# Patient Record
Sex: FEMALE | Race: WHITE | URBAN - NONMETROPOLITAN AREA
[De-identification: names, ages, dates, MRNs, and addresses within clinical notes are randomized per-mention and may not be internally consistent; named-entity substitution may affect disease eponyms.]

---

## 2017-08-24 ENCOUNTER — HOSPITAL ENCOUNTER (OUTPATIENT)
Dept: GENERAL RADIOLOGY | Age: 72
Discharge: HOME | End: 2017-08-24
Attending: EMERGENCY MEDICINE | Admitting: EMERGENCY MEDICINE

## 2017-08-24 DIAGNOSIS — M25.551 RIGHT HIP PAIN: ICD-10-CM

## 2018-05-02 ENCOUNTER — HOSPITAL ENCOUNTER (OUTPATIENT)
Dept: PHYSICAL THERAPY | Age: 73
Discharge: HOME | End: 2018-05-31

## 2018-05-02 ASSESSMENT — PAIN DESCRIPTION - PROGRESSION: CLINICAL_PROGRESSION: GRADUALLY IMPROVING

## 2018-05-02 ASSESSMENT — PAIN DESCRIPTION - ORIENTATION: ORIENTATION: RIGHT

## 2018-05-02 ASSESSMENT — PAIN SCALES - GENERAL: PAINLEVEL_OUTOF10: 0

## 2018-05-02 ASSESSMENT — PAIN DESCRIPTION - FREQUENCY: FREQUENCY: INTERMITTENT

## 2018-05-02 ASSESSMENT — PAIN DESCRIPTION - DESCRIPTORS: DESCRIPTORS: ACHING

## 2018-05-02 ASSESSMENT — PAIN DESCRIPTION - ONSET: ONSET: ON-GOING

## 2018-05-02 ASSESSMENT — PAIN DESCRIPTION - PAIN TYPE: TYPE: SURGICAL PAIN

## 2018-05-04 ENCOUNTER — HOSPITAL ENCOUNTER (OUTPATIENT)
Dept: PHYSICAL THERAPY | Age: 73
Discharge: HOME | End: 2018-05-04

## 2018-05-07 ENCOUNTER — HOSPITAL ENCOUNTER (OUTPATIENT)
Dept: PHYSICAL THERAPY | Age: 73
Discharge: HOME | End: 2018-05-07

## 2018-05-11 ENCOUNTER — HOSPITAL ENCOUNTER (OUTPATIENT)
Dept: PHYSICAL THERAPY | Age: 73
Discharge: HOME | End: 2018-05-11

## 2018-05-18 ENCOUNTER — HOSPITAL ENCOUNTER (OUTPATIENT)
Dept: PHYSICAL THERAPY | Age: 73
Discharge: HOME | End: 2018-05-18

## 2018-05-25 ENCOUNTER — HOSPITAL ENCOUNTER (OUTPATIENT)
Dept: PHYSICAL THERAPY | Age: 73
Discharge: HOME | End: 2018-05-25

## 2018-06-01 ENCOUNTER — HOSPITAL ENCOUNTER (OUTPATIENT)
Dept: OTHER | Age: 73
Discharge: HOME | End: 2018-06-30

## 2025-07-10 ENCOUNTER — HOSPITAL ENCOUNTER (OUTPATIENT)
Dept: PHYSICAL THERAPY | Age: 80
Setting detail: THERAPIES SERIES
Discharge: HOME | End: 2025-07-10
Payer: COMMERCIAL

## 2025-07-10 PROCEDURE — 97016 VASOPNEUMATIC DEVICE THERAPY: CPT

## 2025-07-10 PROCEDURE — 97110 THERAPEUTIC EXERCISES: CPT

## 2025-07-10 PROCEDURE — 97161 PT EVAL LOW COMPLEX 20 MIN: CPT

## 2025-07-10 NOTE — PROGRESS NOTES
Functional Mobility Training, Home Exercise Program, Anatomy of condition, Disease Specific Education             Treatment may include any combination of the following: Current Treatment Recommendations: Strengthening, ROM, Balance training, Gait training, Home exercise program, Stair training, Transfer training, Safety education & training, Neuromuscular re-education, Functional mobility training, Therapeutic activities, ADL/Self-care training, Manual, Patient/Caregiver education & training, IADL training, Pain management, Modalities     Frequency / Duration:  Patient to be seen 2 for 5 weeks      Eval Complexity:    Decision Making: Low Complexity         Therapist Signature: Suzy Beltrán PT, DPT    Date: 7/10/2025     I certify that the above Therapy Services are being furnished while the patient is under my care. I agree with the treatment plan and certify that this therapy is necessary.      Physician's Signature:  ___________________________   Date:_______                                                                   Tom Valdivia DO        Physician Comments: _______________________________________________    Please sign and return to [facility]YS[facility].  Please fax to the location listed below. THANK YOU for this referral!    [facility] PHYSICAL THERAP[facility]  [address]  [place] [facility] [address]  Dept: [phone]  Dept Fax: [phone]  Loc: [phone]       POC NOTE

## 2025-07-10 NOTE — FLOWSHEET NOTE
Therapeutic Activity     [] Ultrasound  [] Estim  [x] Gait Training      [] Cervical Traction [] Lumbar Traction  [x] Neuromuscular Re-education    [x] Cold/hotpack [] Iontophoresis   [x] Instruction in HEP      [x] Vasopneumatic   [] Dry Needling    [x] Manual Therapy               [] Aquatic Therapy              Electronically signed by:  Suzy Beltrán PT, DPT 7/10/2025, 5:02 PM

## 2025-07-10 NOTE — PLAN OF CARE
Outpatient Physical Therapy           [place]           [x] Phone: [phone]   Fax: [phone]  [place]           [] Phone: [phone]   Fax: [phone]     To:  Tom Valdivia, DO   From: Suzy Beltrán, PT, DPT     Patient: Filomena Hartman       : 1945  Diagnosis:  Diagnosis: Unilateral primary OA, right knee  Treatment Diagnosis: RLE weakness, impaired gait, impaired R knee ROM  Date: 7/10/2025    Physical Therapy Certification/Re-Certification Form  Dear Dr. Valdivia,  The following patient has been evaluated for physical therapy services and for therapy to continue, insurance requires physician review of the treatment plan initially and every 90 days. Please review the attached evaluation and/or summary of the patient's plan of care, and verify that you agree therapy should continue by signing the attached document and sending it back to our office.    Assessment:    Assessment: Pt is a 79-year-old female who presents to therapy s/p R TKA performed on 25. Upon assessment, pt does present with impaired R knee ROM, RLE weakness, impaired gait, impaired balance and reduced independence with ADLs and IADLs post-surgery. Pt would benefit from skilled therapy interventions to address listed impairments, progress toward goal completion, and improve ADL/IADL status. PT also warranted to reduce risk for further injury or decline.  Patient agrees with established plan of care and assisted in the development of their short term and long term goals. Patient had no adverse reaction with initial treatment and there are no barriers to learning.  Learning preferences include demonstration, practice, and handouts.  Patient expressed understanding of HEP and appears to be motivated to participate in an active PT program including compliance with HEP expectations.      Plan of Care/Treatment to date:  [x] Therapeutic Exercise  [x] Modalities:  [x] Therapeutic Activity     [] Ultrasound  [] Electrical

## 2025-07-15 ENCOUNTER — HOSPITAL ENCOUNTER (OUTPATIENT)
Dept: PHYSICAL THERAPY | Age: 80
Setting detail: THERAPIES SERIES
Discharge: HOME | End: 2025-07-15
Payer: COMMERCIAL

## 2025-07-15 PROCEDURE — 97016 VASOPNEUMATIC DEVICE THERAPY: CPT

## 2025-07-15 PROCEDURE — 97110 THERAPEUTIC EXERCISES: CPT

## 2025-07-15 NOTE — FLOWSHEET NOTE
wound.   She reports pain of 2-3/10 pain upon arrival.       Any changes in Ambulatory Summary Sheet?  None        Objective:    Knee Flexion AROM: 108 degrees, 109 degrees following heel slides   Knee Ext AROM: 0 degrees   Superior Glide of R patella and good VMO activation with quad sets    Exercises: (No more than 4 columns) \"Davie-Tarah\"  Exercise/Equipment 7/10/25 #1 7/15/25 #2 Date           WARM UP      nustep  L3 5 mins          TABLE      Heel slide X10, 5\" 2x10    Heel prop x1' 2 min    SAQ X10, 3\" 2x10    SLR x10 2x10             STANDING      Heel Raises  x10    Marches   X10 ea leg                                        PROPRIOCEPTION                                    MODALITIES                      Other Therapeutic Activities/Education:  HEP and importance of completion, POC and goals, anatomy and physiology related to condition    Home Exercise Program: Issued, practiced and pt demo ability to perform 7/10/2025  [employer]: [email or web address]      Manual Treatments:  none    Modalities:  Patient received vasocompression on their R knee for pain and inflammation for 10 min on low pressure. Patient had negative skin reaction afterwards.     Turned temp up to 40°. NEXT - put pillow lengthwise under knee.     Communication with other providers:  POC sent    Assessment:     Pt demonstrated good understanding of exercises and needed minimal cueing. AROM has improved. Quad strength and activation is improving nicely.  Pt reported 7/10 following game ready treatment due to leg in extension, 3/10 before leaving clinic.         Plan for Next Session:   Specific Instructions for Next Treatment: nustep, ROM, strength, gait    Time In / Time Out:   4390-7776    Timed Code/Total Treatment Minutes:  46'/56' 1 vaso 3 TE       Next Progress Note due:  10th visit      Plan of Care Interventions:  [x] Therapeutic Exercise  [x] Modalities:  [x] Therapeutic Activity     [] Ultrasound  [] Estim  [x] Gait Training      [] Cervical

## 2025-07-17 ENCOUNTER — HOSPITAL ENCOUNTER (OUTPATIENT)
Dept: PHYSICAL THERAPY | Age: 80
Setting detail: THERAPIES SERIES
Discharge: HOME | End: 2025-07-17
Payer: COMMERCIAL

## 2025-07-17 PROCEDURE — 97110 THERAPEUTIC EXERCISES: CPT

## 2025-07-17 NOTE — FLOWSHEET NOTE
Outpatient Physical Therapy  [place]           [x] Phone: [phone]   Fax: [phone]  [place]           [] Phone: [phone]   Fax: [phone]        Physical Therapy Daily Treatment Note  Date:  2025    Patient Name:  Filomena Hartman    :  1945  MRN: [id number]  Restrictions/Precautions: n/a    Diagnosis:    Diagnosis: Unilateral primary OA, right knee  Date of Injury/Surgery: 25  Treatment Diagnosis:  RLE weakness, impaired gait, impaired R knee ROM  Insurance/Certification information: [email or web address] - $35 copay, BOMN  Referring Physician:   Tom Valdivia DO   Plan of care signed (Y/N):  sent 7/10  Outcome Measure: KOOS:     Visit# / total visits:   3 /10    Pain level:      2 /10           Goals:     Patient goals: improve mobility  Short term goals  Time Frame for Short term goals: 5 visits  Pt will improve R knee ext AROM to 0° to aide in TKE during gait  Long Term Goals  Time Frame for Long Term Goals: 10 visits  Pt will improve R knee flexion AROM to 115° or more to aide in steps  Pt will improve KOOS to 10 or less to show subjective improvement in condition  Pt will improve TUG to 18 seconds or less to show improved gait independence  Pt will improve 5x STS to 18 or less to show improved transfer independence  Pt will improve gross RLE strength to 4/5 to aide in ADLs        Summary of Evaluation:  Assessment: Pt is a 79-year-old female who presents to therapy s/p R TKA performed on 25. Upon assessment, pt does present with impaired R knee ROM, RLE weakness, impaired gait, impaired balance and reduced independence with ADLs and IADLs post-surgery. Pt would benefit from skilled therapy interventions to address listed impairments, progress toward goal completion, and improve ADL/IADL status. PT also warranted to reduce risk for further injury or decline.        Subjective:  pt states she is financially strained by co pays for visits and would like to drop down to 1 x a

## 2025-07-21 ENCOUNTER — HOSPITAL ENCOUNTER (OUTPATIENT)
Dept: PHYSICAL THERAPY | Age: 80
Setting detail: THERAPIES SERIES
Discharge: HOME | End: 2025-07-21
Payer: COMMERCIAL

## 2025-07-21 PROCEDURE — 97016 VASOPNEUMATIC DEVICE THERAPY: CPT

## 2025-07-21 PROCEDURE — 97110 THERAPEUTIC EXERCISES: CPT

## 2025-07-21 NOTE — FLOWSHEET NOTE
Outpatient Physical Therapy  [place]           [x] Phone: [phone]   Fax: [phone]  [place]           [] Phone: [phone]   Fax: [phone]        Physical Therapy Daily Treatment Note  Date:  2025    Patient Name:  Filomena Hartman    :  1945  MRN: [id number]  Restrictions/Precautions: n/a  Diagnosis: Unilateral primary OA, right knee  Date of Injury/Surgery: 25  Treatment Diagnosis:  RLE weakness, impaired gait, impaired R knee ROM  Insurance/Certification information: [employer] - $35 copay, BOMN  Referring Physician:   Tom Valdivia DO   Plan of care signed (Y/N):  sent 7/10  Outcome Measure: KOOS:   Visit# / total visits:   4 /10  Pain level:      2 /10       Goals:     Patient goals: improve mobility  Short term goals  Time Frame for Short term goals: 5 visits  Pt will improve R knee ext AROM to 0° to aide in TKE during gait: MET   Long Term Goals  Time Frame for Long Term Goals: 10 visits  Pt will improve R knee flexion AROM to 115° or more to aide in steps  Pt will improve KOOS to 10 or less to show subjective improvement in condition  Pt will improve TUG to 18 seconds or less to show improved gait independence  Pt will improve 5x STS to 18 or less to show improved transfer independence  Pt will improve gross RLE strength to 4/5 to aide in ADLs        Summary of Evaluation:  Assessment: Pt is a 79-year-old female who presents to therapy s/p R TKA performed on 25. Upon assessment, pt does present with impaired R knee ROM, RLE weakness, impaired gait, impaired balance and reduced independence with ADLs and IADLs post-surgery. Pt would benefit from skilled therapy interventions to address listed impairments, progress toward goal completion, and improve ADL/IADL status. PT also warranted to reduce risk for further injury or decline.        Subjective:  Pt is doing alright this date but did have lots of pain over the weekend after her last session. Does have one

## 2025-07-23 ENCOUNTER — HOSPITAL ENCOUNTER (OUTPATIENT)
Dept: PHYSICAL THERAPY | Age: 80
Setting detail: THERAPIES SERIES
Discharge: HOME | End: 2025-07-23
Payer: COMMERCIAL

## 2025-07-23 PROCEDURE — 97140 MANUAL THERAPY 1/> REGIONS: CPT

## 2025-07-23 PROCEDURE — 97016 VASOPNEUMATIC DEVICE THERAPY: CPT

## 2025-07-23 PROCEDURE — 97110 THERAPEUTIC EXERCISES: CPT

## 2025-07-23 NOTE — FLOWSHEET NOTE
Outpatient Physical Therapy  [place]           [x] Phone: [phone]   Fax: [phone]  [place]           [] Phone: [phone]   Fax: [phone]        Physical Therapy Daily Treatment Note  Date:  2025    Patient Name:  Filomena Hartman    :  1945  MRN: [id number]  Restrictions/Precautions: n/a  Diagnosis: Unilateral primary OA, right knee  Date of Injury/Surgery: 25  Treatment Diagnosis:  RLE weakness, impaired gait, impaired R knee ROM  Insurance/Certification information: [employer] - $35 copay, BOMN  Referring Physician:   Tom Valdivia DO  2025  Plan of care signed (Y/N):  sent 7/10  Outcome Measure: KOOS:   Visit# / total visits:   5 /10  Pain level:      2/10       Goals:     Patient goals: improve mobility  Short term goals  Time Frame for Short term goals: 5 visits  Pt will improve R knee ext AROM to 0° to aide in TKE during gait: MET   Long Term Goals  Time Frame for Long Term Goals: 10 visits  Pt will improve R knee flexion AROM to 115° or more to aide in steps  Pt will improve KOOS to 10 or less to show subjective improvement in condition  Pt will improve TUG to 18 seconds or less to show improved gait independence  Pt will improve 5x STS to 18 or less to show improved transfer independence  Pt will improve gross RLE strength to 4/5 to aide in ADLs        Summary of Evaluation:  Assessment: Pt is a 79-year-old female who presents to therapy s/p R TKA performed on 25. Upon assessment, pt does present with impaired R knee ROM, RLE weakness, impaired gait, impaired balance and reduced independence with ADLs and IADLs post-surgery. Pt would benefit from skilled therapy interventions to address listed impairments, progress toward goal completion, and improve ADL/IADL status. PT also warranted to reduce risk for further injury or decline.        Subjective:  Pt stated that her knee pain was 2/10 today.     Any changes in Ambulatory Summary Sheet?

## 2025-07-28 ENCOUNTER — HOSPITAL ENCOUNTER (OUTPATIENT)
Dept: PHYSICAL THERAPY | Age: 80
Setting detail: THERAPIES SERIES
Discharge: HOME | End: 2025-07-28
Payer: COMMERCIAL

## 2025-07-28 PROCEDURE — 97016 VASOPNEUMATIC DEVICE THERAPY: CPT

## 2025-07-28 PROCEDURE — 97110 THERAPEUTIC EXERCISES: CPT

## 2025-07-28 PROCEDURE — 97140 MANUAL THERAPY 1/> REGIONS: CPT

## 2025-07-28 PROCEDURE — 97112 NEUROMUSCULAR REEDUCATION: CPT

## 2025-07-28 NOTE — FLOWSHEET NOTE
Outpatient Physical Therapy  [place]           [x] Phone: [phone]   Fax: [phone]  [place]           [] Phone: [phone]   Fax: [phone]        Physical Therapy Daily Treatment Note  Date:  2025    Patient Name:  Filomena Hartman    :  1945  MRN: [id number]  Restrictions/Precautions: n/a  Diagnosis: Unilateral primary OA, right knee  Date of Injury/Surgery: 25  Treatment Diagnosis:  RLE weakness, impaired gait, impaired R knee ROM  Insurance/Certification information: [employer] - $35 copay, BOMN  Referring Physician:   Tom Valdivia DO  2025  Plan of care signed (Y/N):  sent 7/10  Outcome Measure: KOOS:   Visit# / total visits:  6/10  Pain level:    2/10       Goals:     Patient goals: improve mobility  Short term goals  Time Frame for Short term goals: 5 visits  Pt will improve R knee ext AROM to 0° to aide in TKE during gait: MET   Long Term Goals  Time Frame for Long Term Goals: 10 visits  Pt will improve R knee flexion AROM to 115° or more to aide in steps  Pt will improve KOOS to 10 or less to show subjective improvement in condition  Pt will improve TUG to 18 seconds or less to show improved gait independence  Pt will improve 5x STS to 18 or less to show improved transfer independence  Pt will improve gross RLE strength to 4/5 to aide in ADLs        Summary of Evaluation:  Assessment: Pt is a 79-year-old female who presents to therapy s/p R TKA performed on 25. Upon assessment, pt does present with impaired R knee ROM, RLE weakness, impaired gait, impaired balance and reduced independence with ADLs and IADLs post-surgery. Pt would benefit from skilled therapy interventions to address listed impairments, progress toward goal completion, and improve ADL/IADL status. PT also warranted to reduce risk for further injury or decline.        Subjective:  Pt stated that her knee pain was  2/10 today. Pt stated that the manual treatment helped a lot last visit.

## 2025-08-04 ENCOUNTER — HOSPITAL ENCOUNTER (OUTPATIENT)
Dept: PHYSICAL THERAPY | Age: 80
Setting detail: THERAPIES SERIES
Discharge: HOME | End: 2025-08-04
Payer: COMMERCIAL

## 2025-08-04 PROCEDURE — 97016 VASOPNEUMATIC DEVICE THERAPY: CPT

## 2025-08-04 PROCEDURE — 97110 THERAPEUTIC EXERCISES: CPT

## 2025-08-11 ENCOUNTER — HOSPITAL ENCOUNTER (OUTPATIENT)
Dept: PHYSICAL THERAPY | Age: 80
Setting detail: THERAPIES SERIES
Discharge: HOME | End: 2025-08-11
Payer: COMMERCIAL

## 2025-08-11 PROCEDURE — 97110 THERAPEUTIC EXERCISES: CPT
